# Patient Record
Sex: MALE | Race: WHITE | NOT HISPANIC OR LATINO | Employment: FULL TIME | URBAN - NONMETROPOLITAN AREA
[De-identification: names, ages, dates, MRNs, and addresses within clinical notes are randomized per-mention and may not be internally consistent; named-entity substitution may affect disease eponyms.]

---

## 2017-11-21 ENCOUNTER — OFFICE VISIT (OUTPATIENT)
Dept: URGENT CARE | Facility: CLINIC | Age: 37
End: 2017-11-21
Payer: COMMERCIAL

## 2017-11-21 PROCEDURE — 90715 TDAP VACCINE 7 YRS/> IM: CPT

## 2017-11-21 PROCEDURE — G0382 LEV 3 HOSP TYPE B ED VISIT: HCPCS

## 2017-11-21 PROCEDURE — 99283 EMERGENCY DEPT VISIT LOW MDM: CPT

## 2017-11-21 PROCEDURE — 90471 IMMUNIZATION ADMIN: CPT

## 2017-11-23 NOTE — PROGRESS NOTES
Assessment    1  Facial laceration, initial encounter (363 40) (S01 81XA)   2  Injury of head, initial encounter (349 01) (S09 90XA)    Plan  Facial laceration, initial encounter    · Cephalexin 500 MG Oral Capsule; TAKE 1 CAPSULE 3 TIMES DAILY UNTIL GONE   · Tdap (Boostrix)    Discussion/Summary  Discussion Summary:   Chin laceration closed approx 14 hours after initial injury no s/s of infection at present, also placed on keflex to prevent infection  Discussed with patient due to striking head with LOC he should have eval at ER, he is refusing and ER eval at this time, explained risks to patient who verbalized understanding  Discussed with patient to return in 3-5 days for suture removal    Medication Side Effects Reviewed: Possible side effects of new medications were reviewed with the patient/guardian today  Understands and agrees with treatment plan: The treatment plan was reviewed with the patient/guardian  The patient/guardian understands and agrees with the treatment plan   Counseling Documentation With Imm: The patient was counseled regarding instructions for management,-- patient and family education,-- importance of compliance with treatment  total time of encounter was 25 minutes-- and-- 10 minutes was spent counseling  Follow Up Instructions:   suture removal 3-5 days--   Follow Up with your Primary Care Provider in 1-2 days  If your symptoms worsen, go to the nearest [facility] Emergency Department  Chief Complaint    1  Skin Wound  Chief Complaint Free Text Note Form: Hit face off foot peg on his quad hitting head off floor states he had LOC for a second or two and came too has headache this am from it laceration to left chin area bandage in place from home he states no pain area was cleansed but will not stop bleeding        History of Present Illness  HPI: 40year old male at urgent care today with chief complaint of hitting head off floor last evening at 8pm states he had LOC for a second or two and came too has headache this am from it laceration to left chin area bandage in place from home he states no pain area was cleansed but will not stop bleeding  Hospital Based Practices Required Assessment:  Pain Assessment  the patient states they do not have pain  (on a scale of 0 to 10, the patient rates the pain at 0 )  Abuse And Domestic Violence Screen   Yes, the patient is safe at home  -- The patient states no one is hurting them  Depression And Suicide Screen  No, the patient has not had thoughts of hurting themself  No, the patient has not felt depressed in the past 7 days  Prefered Language is  [place]  Primary Language is  English  Readiness To Learn: Receptive  Barriers To Learning: none  Preferred Learning: verbal  Education Completed: disease/condition,-- medications-- and-- further treatment/follow-up  Teaching Method: verbal  Person Taught: patient  Evaluation Of Learning: verbalized/demonstrated understanding   Skin Wound: Gregory Ocampo presents with complaints of skin wound  Review of Systems  Focused-Male:  Constitutional: no fever or chills, feels well, no tiredness, no recent weight loss or weight gain  ENT: no complaints of earache, no loss of hearing, no nosebleeds or nasal discharge, no sore throat or hoarseness  Cardiovascular: no complaints of slow or fast heart rate, no chest pain, no palpitations, no leg claudication or lower extremity edema  Respiratory: no complaints of shortness of breath, no wheezing or cough, no dyspnea on exertion, no orthopnea or PND  Gastrointestinal: no complaints of abdominal pain, no constipation, no nausea or vomiting, no diarrhea or bloody stools  Genitourinary: no complaints of dysuria or incontinence, no hesitancy, no nocturia, no genital lesion, no inadequacy of penile erection  Musculoskeletal: no complaints of arthralgia, no myalgia, no joint swelling or stiffness, no limb pain or swelling    Integumentary: skin wound, but-- as noted in HPI  Neurological: headache, but-- as noted in HPI  ROS Reviewed:   ROS reviewed  Active Problems  1  Facial laceration, initial encounter (873 40) (I10 56ZK)    Past Medical History  Active Problems And Past Medical History Reviewed: The active problems and past medical history were reviewed and updated today  Family History  Family History Reviewed: The family history was reviewed and updated today  Social History   · Current every day smoker (305 1) (M33 869)   · No illicit drug use   · Social alcohol use (Z78 9)  Social History Reviewed: The social history was reviewed and updated today  The social history was reviewed and is unchanged  Surgical History  Surgical History Reviewed: The surgical history was reviewed and updated today  Current Meds   1  No Reported Medications Recorded  Medication List Reviewed: The medication list was reviewed and updated today  Allergies    1  No Known Drug Allergies    Vitals  Signs   Recorded: 21Nov2017 09:54AM   Temperature: 97 7 F  Heart Rate: 95  Respiration: 20  Systolic: 941  Diastolic: 92  Height: 5 ft 4 in  Weight: 163 lb 6 oz  BMI Calculated: 28 04  BSA Calculated: 1 79  O2 Saturation: 96    Physical Exam   Constitutional  General appearance: No acute distress, well appearing and well nourished  Eyes  Conjunctiva and lids: No swelling, erythema, or discharge  Pupils and irises: Equal, round and reactive to light  Ears, Nose, Mouth, and Throat  External inspection of ears and nose: Normal    Oropharynx: Normal with no erythema, edema, exudate or lesions  Pulmonary  Respiratory effort: No increased work of breathing or signs of respiratory distress  Auscultation of lungs: Clear to auscultation  Cardiovascular  Palpation of heart: Normal PMI, no thrills  Auscultation of heart: Normal rate and rhythm, normal S1 and S2, without murmurs     Lymphatic  Palpation of lymph nodes in neck: No lymphadenopathy  Musculoskeletal  Gait and station: Normal    Digits and nails: Normal without clubbing or cyanosis  Inspection/palpation of joints, bones, and muscles: Normal    Skin  Skin and subcutaneous tissue: Abnormal  -- laceration to chin 2cm no bleeding draiange noted at this time  Neurologic  Cranial nerves: Cranial nerves 2-12 intact  Reflexes: 2+ and symmetric  Sensation: No sensory loss  Psychiatric  Orientation to person, place and time: Normal    Mood and affect: Normal        Procedure   Procedure: wound repair  The wound was located on the chin,-- and was 2 cm in length  The wound was simple  The wound involved the subcutaneous tissue  The wound was linear-- and-- was clean, but-- did not have a foreign body-- and-- did not have tissue loss  the neurovascular exam was normal, but-- there was no sensory deficit,-- there was no motor deficit-- and-- there was no vascular deficit  The site was prepped with Betadine, cleansed and irrigated extensively  Lidocaine 2 ml, 1%, without epinephrine was injected  Closure: The cutaneous layer was closed with 2 sutures of 4-0 nylon--   simple interrupted sutures were used for the skin closure  Dressing: a sterile dressing was placed-- and-- an antibiotic ointment was applied  Patient Status:  the patient tolerated the procedure well        Signatures   Electronically signed by : Ramses Dave NP; Nov 21 2017 10:30AM EST                       (Author)    Electronically signed by : ASH Hoyt ; Nov 22 2017  1:27PM EST                       (Co-author)

## 2019-07-30 ENCOUNTER — OFFICE VISIT (OUTPATIENT)
Dept: URGENT CARE | Facility: CLINIC | Age: 39
End: 2019-07-30
Payer: COMMERCIAL

## 2019-07-30 VITALS
BODY MASS INDEX: 29.02 KG/M2 | TEMPERATURE: 98.3 F | HEART RATE: 100 BPM | RESPIRATION RATE: 18 BRPM | SYSTOLIC BLOOD PRESSURE: 124 MMHG | DIASTOLIC BLOOD PRESSURE: 82 MMHG | OXYGEN SATURATION: 96 % | HEIGHT: 64 IN | WEIGHT: 170 LBS

## 2019-07-30 DIAGNOSIS — S70.362A TICK BITE OF LEFT THIGH, INITIAL ENCOUNTER: Primary | ICD-10-CM
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
## 2019-07-30 PROCEDURE — 99203 OFFICE O/P NEW LOW 30 MIN: CPT | Performed by: PHYSICIAN ASSISTANT

## 2019-07-30 RX ORDER — DOXYCYCLINE 100 MG/1
100 TABLET ORAL 2 TIMES DAILY
Qty: 28 TABLET | Refills: 0 | Status: SHIPPED | OUTPATIENT
Start: 2019-07-30 | End: 2019-08-13

## 2019-07-30 NOTE — PROGRESS NOTES
[facility]        NAME: Moiz Adan is a 44 y o  male  : 1980    MRN: [id number]  DATE: 2019  TIME: 3:48 PM    Assessment and Plan   Tick bite of left thigh, initial encounter Romulo Laureano  XXXA]  1  Tick bite of left thigh, initial encounter  doxycycline (ADOXA) 100 MG tablet         Patient Instructions     Due to symptoms with known tick bite in an endemic area, will treat for possible Lyme disease  Follow up with PCP in 3-5 days  Proceed to  ER if symptoms worsen  Chief Complaint     Chief Complaint   Patient presents with    Tick Removal     Pt states that he had a tick bite on his left knee 2 weeks ago  Pt removed the tick  Pt states that he broke otu in hives 2 days afterwards  Pt c/o joint pain and headache x 2 days  Pt is concerned about Lymes Disease  History of Present Illness       43 y/o M presents for eval of tick bite on L inner thigh 2 weeks ago  Patient states tick was attached for less than 24 hours and he removed it without issue, tick was not engorged  He did have a localized reaction to it the first day but that resolved  2 days later he did have diffuse hives which also resolved on their own  However, states 2 days ago he developed diffuse joint pains in his elbows, knees, and wrists as well as a headache which is unusual for him  He took ibuprofen with improvement in headache  No fever, chest pain, palpitations, numbness or weakness, tingling, N/V  Review of Systems   Review of Systems   All other systems reviewed and are negative          Current Medications       Current Outpatient Medications:     doxycycline (ADOXA) 100 MG tablet, Take 1 tablet (100 mg total) by mouth 2 (two) times a day for 14 days, Disp: 28 tablet, Rfl: 0    Current Allergies     Allergies as of 2019    (No Known Allergies)            The following portions of the patient's history were reviewed and updated as appropriate: allergies, current medications, past family history, past medical history, past social history, past surgical history and problem list      History reviewed  No pertinent past medical history  Past Surgical History:   Procedure Laterality Date    ARTHROSCOPIC REPAIR ACL Right     KNEE ARTHROSCOPY W/ MENISCAL REPAIR Right        No family history on file  Medications have been verified  Objective   /82   Pulse 100   Temp 98 3 °F (36 8 °C) (Tympanic)   Resp 18   Ht 5' 4" (1 626 m)   Wt 77 1 kg (170 lb)   SpO2 96%   BMI 29 18 kg/m²        Physical Exam     Physical Exam   Constitutional: He is oriented to person, place, and time  He appears well-developed and well-nourished  No distress  HENT:   Head: Normocephalic and atraumatic  Right Ear: External ear normal    Left Ear: External ear normal    Nose: Nose normal    Mouth/Throat: Oropharynx is clear and moist    Eyes: Pupils are equal, round, and reactive to light  Conjunctivae and EOM are normal    Neck: Normal range of motion  Neck supple  Cardiovascular: Normal rate, regular rhythm and intact distal pulses  Exam reveals no gallop and no friction rub  No murmur heard  Pulmonary/Chest: Effort normal and breath sounds normal  No respiratory distress  He has no wheezes  He has no rales  Abdominal: Soft  Bowel sounds are normal  He exhibits no distension and no mass  There is no tenderness  There is no rebound and no guarding  Musculoskeletal: Normal range of motion  Neurological: He is alert and oriented to person, place, and time  No cranial nerve deficit or sensory deficit  He exhibits normal muscle tone  Skin: Skin is warm and dry  Capillary refill takes less than 2 seconds  Psychiatric: He has a normal mood and affect  His behavior is normal  Judgment and thought content normal    Nursing note and vitals reviewed

## 2020-03-12 ENCOUNTER — OFFICE VISIT (OUTPATIENT)
Dept: URGENT CARE | Facility: CLINIC | Age: 40
End: 2020-03-12
Payer: COMMERCIAL

## 2020-03-12 VITALS
SYSTOLIC BLOOD PRESSURE: 172 MMHG | OXYGEN SATURATION: 97 % | WEIGHT: 167.11 LBS | TEMPERATURE: 99 F | BODY MASS INDEX: 28.53 KG/M2 | HEART RATE: 100 BPM | HEIGHT: 64 IN | RESPIRATION RATE: 16 BRPM | DIASTOLIC BLOOD PRESSURE: 108 MMHG

## 2020-03-12 DIAGNOSIS — J06.9 ACUTE RESPIRATORY DISEASE: Primary | ICD-10-CM

## 2020-03-12 PROCEDURE — 99213 OFFICE O/P EST LOW 20 MIN: CPT

## 2020-03-12 RX ORDER — BENZONATATE 200 MG/1
200 CAPSULE ORAL 3 TIMES DAILY PRN
Qty: 20 CAPSULE | Refills: 0 | Status: SHIPPED | OUTPATIENT
Start: 2020-03-12

## 2020-03-12 NOTE — PROGRESS NOTES
[facility]        NAME: Emelina Steele is a 44 y o  male  : 1980    MRN: [id number]  DATE: 2020  TIME: 4:52 PM    Assessment and Plan   Acute respiratory disease [J06 9]  1  Acute respiratory disease  benzonatate (TESSALON) 200 MG capsule     Patient denies HA, dizziness, change in vision, chest pain, SOB, heart palpitations  Patient was instructed to make appointment with a PCP and go to ED if became symptomatic  This patient's information was charted under an incorrect chart  This was noted in the other patient's chart  Incident has been reported  Patient Instructions     Take Tessalon as prescribed  Mucinex during the day  Fluids and rest  Tylenol/Ibuprofen for pain/fever  Salt water gargles and chloraseptic spray  Warm compresses over sinuses  Steam treatment (utilize proper safety precautions when in contact with hot water/steam)  Follow up with PCP in 3-5 days and discuss blood pressure  Proceed to  ER if symptoms worsen  Chief Complaint     Chief Complaint   Patient presents with    Sore Throat     COUGH         History of Present Illness       Patient denies travel to an area with widespread novel coronavirus transmission in the past 2 weeks ([place], [place], [place], [place], [place], [place], [place], [facility], [address] the [facility], [address] [facility]) or exposure to others with confirmed COVID-19  URI    This is a new problem  The current episode started in the past 7 days  The problem has been unchanged  There has been no fever  Associated symptoms include congestion, coughing, headaches (mild), rhinorrhea and a sore throat  Pertinent negatives include no abdominal pain, chest pain, diarrhea, ear pain, nausea, rash, sinus pain, sneezing, vomiting or wheezing  He has tried nothing for the symptoms  Review of Systems   Review of Systems   Constitutional: Negative for activity change, appetite change, chills and fever     HENT: Positive for congestion, rhinorrhea and sore throat  Negative for dental problem, ear discharge, ear pain, facial swelling, postnasal drip, sinus pressure, sinus pain, sneezing and trouble swallowing  Eyes: Negative for itching  Respiratory: Positive for cough  Negative for chest tightness, shortness of breath and wheezing  Cardiovascular: Negative for chest pain and palpitations  Gastrointestinal: Negative for abdominal pain, constipation, diarrhea, nausea and vomiting  Musculoskeletal: Negative for myalgias  Skin: Negative for rash  Neurological: Positive for headaches (mild)  Negative for dizziness, weakness and light-headedness  Current Medications       Current Outpatient Medications:     benzonatate (TESSALON) 200 MG capsule, Take 1 capsule (200 mg total) by mouth 3 (three) times a day as needed for cough, Disp: 20 capsule, Rfl: 0    Current Allergies     Allergies as of 03/12/2020    (No Known Allergies)            The following portions of the patient's history were reviewed and updated as appropriate: allergies, current medications, past family history, past medical history, past social history, past surgical history and problem list      History reviewed  No pertinent past medical history  Past Surgical History:   Procedure Laterality Date    ARTHROSCOPIC REPAIR ACL Right     KNEE ARTHROSCOPY W/ MENISCAL REPAIR Right        History reviewed  No pertinent family history  Medications have been verified  Objective   BP (!) 172/108   Pulse 100   Temp 99 °F (37 2 °C)   Resp 16   Ht 5' 4" (1 626 m)   Wt 75 8 kg (167 lb 1 7 oz)   SpO2 97%   BMI 28 68 kg/m²        Physical Exam     Physical Exam   Constitutional: He is oriented to person, place, and time  He appears well-developed and well-nourished  No distress  HENT:   Head: Normocephalic     Right Ear: External ear normal    Left Ear: External ear normal    Nose: Nose normal    Mouth/Throat: Oropharynx is clear and moist  No oropharyngeal exudate  Eyes: Conjunctivae are normal    Cardiovascular: Normal rate, regular rhythm, normal heart sounds and intact distal pulses  Exam reveals no gallop and no friction rub  No murmur heard  Pulmonary/Chest: Effort normal and breath sounds normal  No respiratory distress  He has no wheezes  He has no rales  He exhibits no tenderness  Lymphadenopathy:     He has no cervical adenopathy  Neurological: He is alert and oriented to person, place, and time  Skin: Skin is warm  He is not diaphoretic  Psychiatric: He has a normal mood and affect  His behavior is normal  Judgment and thought content normal    Vitals reviewed